# Patient Record
Sex: FEMALE | Race: WHITE | NOT HISPANIC OR LATINO | Employment: UNEMPLOYED | URBAN - NONMETROPOLITAN AREA
[De-identification: names, ages, dates, MRNs, and addresses within clinical notes are randomized per-mention and may not be internally consistent; named-entity substitution may affect disease eponyms.]

---

## 2019-06-28 ENCOUNTER — OFFICE VISIT (OUTPATIENT)
Dept: FAMILY MEDICINE CLINIC | Facility: CLINIC | Age: 39
End: 2019-06-28
Payer: COMMERCIAL

## 2019-06-28 VITALS
HEIGHT: 64 IN | SYSTOLIC BLOOD PRESSURE: 124 MMHG | WEIGHT: 250.8 LBS | BODY MASS INDEX: 42.82 KG/M2 | DIASTOLIC BLOOD PRESSURE: 68 MMHG

## 2019-06-28 DIAGNOSIS — Z13.9 SCREENING FOR UNSPECIFIED CONDITION: ICD-10-CM

## 2019-06-28 DIAGNOSIS — L40.9 PSORIASIS: Primary | ICD-10-CM

## 2019-06-28 DIAGNOSIS — L40.9 PSORIASIS OF SCALP: ICD-10-CM

## 2019-06-28 DIAGNOSIS — E66.01 MORBID OBESITY WITH BMI OF 40.0-44.9, ADULT (HCC): ICD-10-CM

## 2019-06-28 DIAGNOSIS — E78.5 HYPERLIPIDEMIA, UNSPECIFIED HYPERLIPIDEMIA TYPE: ICD-10-CM

## 2019-06-28 PROBLEM — Z87.39 HISTORY OF NECROTIZING FASCIITIS: Status: ACTIVE | Noted: 2019-06-28

## 2019-06-28 PROCEDURE — 99204 OFFICE O/P NEW MOD 45 MIN: CPT | Performed by: PHYSICIAN ASSISTANT

## 2019-06-28 PROCEDURE — 4004F PT TOBACCO SCREEN RCVD TLK: CPT | Performed by: PHYSICIAN ASSISTANT

## 2019-06-28 PROCEDURE — 3008F BODY MASS INDEX DOCD: CPT | Performed by: PHYSICIAN ASSISTANT

## 2019-06-28 RX ORDER — CLOBETASOL PROPIONATE 0.5 MG/G
AEROSOL, FOAM TOPICAL 2 TIMES DAILY
Qty: 100 G | Refills: 1 | Status: SHIPPED | OUTPATIENT
Start: 2019-06-28 | End: 2019-11-15 | Stop reason: ALTCHOICE

## 2019-06-28 RX ORDER — TRIAMCINOLONE ACETONIDE 1 MG/G
CREAM TOPICAL 2 TIMES DAILY
Qty: 80 G | Refills: 1 | Status: SHIPPED | OUTPATIENT
Start: 2019-06-28 | End: 2019-11-15 | Stop reason: ALTCHOICE

## 2019-06-28 RX ORDER — CITALOPRAM 40 MG/1
40 TABLET ORAL DAILY
COMMUNITY

## 2019-10-17 ENCOUNTER — TELEPHONE (OUTPATIENT)
Dept: FAMILY MEDICINE CLINIC | Facility: CLINIC | Age: 39
End: 2019-10-17

## 2019-11-05 ENCOUNTER — HOSPITAL ENCOUNTER (EMERGENCY)
Facility: HOSPITAL | Age: 39
Discharge: HOME | End: 2019-11-05
Attending: EMERGENCY MEDICINE | Admitting: EMERGENCY MEDICINE
Payer: COMMERCIAL

## 2019-11-05 VITALS
HEIGHT: 64 IN | SYSTOLIC BLOOD PRESSURE: 109 MMHG | HEART RATE: 81 BPM | TEMPERATURE: 97.2 F | DIASTOLIC BLOOD PRESSURE: 57 MMHG | RESPIRATION RATE: 18 BRPM | OXYGEN SATURATION: 97 % | BODY MASS INDEX: 44.19 KG/M2 | WEIGHT: 258.82 LBS

## 2019-11-05 DIAGNOSIS — S61.512A LACERATION OF LEFT WRIST, INITIAL ENCOUNTER: Primary | ICD-10-CM

## 2019-11-05 PROCEDURE — 90471 IMMUNIZATION ADMIN: CPT

## 2019-11-05 PROCEDURE — 99283 EMERGENCY DEPT VISIT LOW MDM: CPT

## 2019-11-05 PROCEDURE — 12002 RPR S/N/AX/GEN/TRNK2.6-7.5CM: CPT | Performed by: EMERGENCY MEDICINE

## 2019-11-05 PROCEDURE — 90715 TDAP VACCINE 7 YRS/> IM: CPT | Performed by: EMERGENCY MEDICINE

## 2019-11-05 PROCEDURE — 99284 EMERGENCY DEPT VISIT MOD MDM: CPT | Performed by: EMERGENCY MEDICINE

## 2019-11-05 RX ORDER — CLINDAMYCIN HYDROCHLORIDE 150 MG/1
300 CAPSULE ORAL ONCE
Status: COMPLETED | OUTPATIENT
Start: 2019-11-05 | End: 2019-11-05

## 2019-11-05 RX ORDER — LIDOCAINE HYDROCHLORIDE AND EPINEPHRINE BITARTRATE 20; .01 MG/ML; MG/ML
1 INJECTION, SOLUTION SUBCUTANEOUS ONCE
Status: COMPLETED | OUTPATIENT
Start: 2019-11-05 | End: 2019-11-05

## 2019-11-05 RX ORDER — CLINDAMYCIN HYDROCHLORIDE 300 MG/1
300 CAPSULE ORAL 3 TIMES DAILY
Qty: 15 CAPSULE | Refills: 0 | Status: SHIPPED | OUTPATIENT
Start: 2019-11-05 | End: 2019-11-10

## 2019-11-05 RX ADMIN — CLINDAMYCIN HYDROCHLORIDE 300 MG: 150 CAPSULE ORAL at 21:32

## 2019-11-05 RX ADMIN — LIDOCAINE HYDROCHLORIDE,EPINEPHRINE BITARTRATE 1 ML: 20; .01 INJECTION, SOLUTION INFILTRATION; PERINEURAL at 21:32

## 2019-11-05 RX ADMIN — MUPIROCIN: 20 OINTMENT TOPICAL at 21:17

## 2019-11-05 RX ADMIN — TETANUS TOXOID, REDUCED DIPHTHERIA TOXOID AND ACELLULAR PERTUSSIS VACCINE, ADSORBED 0.5 ML: 5; 2.5; 8; 8; 2.5 SUSPENSION INTRAMUSCULAR at 21:18

## 2019-11-06 NOTE — ED PROVIDER NOTES
History  Chief Complaint   Patient presents with    Laceration     20 minutes ago pt was using a knife to  a piece of cake when the knife slipped, lacerating her left wrist      70-year-old female presents with 4 cm laceration to the left wrist after accidentally stabbing herself while attempting scraper Brownie out of a pan  Bleeding is controlled at present      History provided by:  Patient  Laceration   Location: Left wrist   Length:  4  Depth:  Cutaneous  Quality: straight    Bleeding: venous    Laceration mechanism:  Knife  Pain details:     Quality:  Aching    Severity:  Mild  Foreign body present:  No foreign bodies  Relieved by:  Nothing  Worsened by:  Nothing  Associated symptoms: no fever and no focal weakness        Prior to Admission Medications   Prescriptions Last Dose Informant Patient Reported? Taking?   citalopram (CeleXA) 40 mg tablet Not Taking at Unknown time Self Yes No   Sig: Take 40 mg by mouth daily   clobetasol (OLUX) 0 05 % topical foam   No No   Sig: Apply topically 2 (two) times a day for 14 days   triamcinolone (KENALOG) 0 1 % cream Not Taking at Unknown time  No No   Sig: Apply topically 2 (two) times a day   Patient not taking: Reported on 11/5/2019      Facility-Administered Medications: None       History reviewed  No pertinent past medical history  Past Surgical History:   Procedure Laterality Date    BACK SURGERY      BREAST SURGERY      CHOLECYSTECTOMY      FASCIOTOMY      TONSILECTOMY AND ADNOIDECTOMY      TUBAL LIGATION         Family History   Problem Relation Age of Onset    Multiple sclerosis Mother     Cancer Father      I have reviewed and agree with the history as documented      Social History     Tobacco Use    Smoking status: Current Every Day Smoker     Packs/day: 0 50     Types: Cigarettes    Smokeless tobacco: Never Used   Substance Use Topics    Alcohol use: Yes     Frequency: Monthly or less    Drug use: Never        Review of Systems Constitutional: Negative for fever  HENT: Negative  Eyes: Negative  Respiratory: Negative  Cardiovascular: Negative  Gastrointestinal: Negative  Endocrine: Negative  Genitourinary: Negative  Musculoskeletal: Negative  Skin:        4 cm laceration to left wrist   Allergic/Immunologic: Negative  Neurological: Negative  Negative for focal weakness  Hematological: Negative  Psychiatric/Behavioral: Negative  All other systems reviewed and are negative  Physical Exam  Physical Exam   Constitutional: She appears well-developed and well-nourished  HENT:   Head: Normocephalic  Right Ear: External ear normal    Left Ear: External ear normal    Eyes: Pupils are equal, round, and reactive to light  Neck: Normal range of motion  Cardiovascular: Normal rate, regular rhythm and normal heart sounds  Pulmonary/Chest: Effort normal    Abdominal: Soft  Musculoskeletal:   4 cm laceration left wrist   Full range of motion of the wrist   No involving the joint space  Full range of motion of fingers  Neurological: She is alert  She displays normal reflexes  No cranial nerve deficit  Coordination normal    Skin: Capillary refill takes less than 2 seconds  4 cm laceration left   Psychiatric: She has a normal mood and affect  Her behavior is normal    Vitals reviewed        Vital Signs  ED Triage Vitals [11/05/19 2108]   Temperature Pulse Respirations Blood Pressure SpO2   (!) 97 2 °F (36 2 °C) 81 18 109/57 97 %      Temp Source Heart Rate Source Patient Position - Orthostatic VS BP Location FiO2 (%)   Temporal Monitor Sitting Right arm --      Pain Score       8           Vitals:    11/05/19 2108   BP: 109/57   Pulse: 81   Patient Position - Orthostatic VS: Sitting         Visual Acuity      ED Medications  Medications   tetanus-diphtheria-acellular pertussis (BOOSTRIX) IM injection 0 5 mL (0 5 mL Intramuscular Given 11/5/19 2118)   lidocaine-epinephrine (XYLOCAINE/EPINEPHRINE) 2 %-1:100,000 injection 1 mL (1 mL Infiltration Given 11/5/19 2132)   mupirocin (BACTROBAN) 2 % ointment ( Topical Given 11/5/19 2117)   clindamycin (CLEOCIN) capsule 300 mg (300 mg Oral Given 11/5/19 2132)       Diagnostic Studies  Results Reviewed     None                 No orders to display              Procedures  Procedures       ED Course                               MDM    Disposition  Final diagnoses:   Laceration of left wrist, initial encounter     Time reflects when diagnosis was documented in both MDM as applicable and the Disposition within this note     Time User Action Codes Description Comment    11/5/2019  9:29 PM Anna Olivoons Add [[id number]] Laceration of left wrist, initial encounter       ED Disposition     ED Disposition Condition Date/Time Comment    Discharge Stable Tue Nov 5, 2019  9:29 PM Rudi Crews discharge to home/self care  Follow-up Information     Follow up With Specialties Details Why Contact Info    Terri Cooley PA-C Physician Assistant In 1 day  [address] [address]  [place] [phone]            Patient's Medications   Discharge Prescriptions    CLINDAMYCIN (CLEOCIN) 300 MG CAPSULE    Take 1 capsule (300 mg total) by mouth 3 (three) times a day for 5 days       Start Date: 11/5/2019 End Date: 11/10/2019       Order Dose: 300 mg       Quantity: 15 capsule    Refills: 0     No discharge procedures on file      ED Provider  Electronically Signed by           Angel Ward DO  11/05/19 2135

## 2019-11-06 NOTE — DISCHARGE INSTRUCTIONS
Please keep the wound clean and dry for 72 hours    Please follow-up in 7-10 days to have sutures removed

## 2019-11-06 NOTE — ED PROCEDURE NOTE
PROCEDURE  Laceration repair  Date/Time: 11/5/2019 9:30 PM  Performed by: Nathan Orellana DO  Authorized by: Nathan Orellana DO   Consent: Verbal consent obtained    Consent given by: patient  Patient understanding: patient states understanding of the procedure being performed  Patient identity confirmed: verbally with patient  Location: Left dorsal wrist   Laceration length: 4 cm  Tendon involvement: none  Nerve involvement: none  Anesthesia: local infiltration    Anesthesia:  Local Anesthetic: lidocaine 2% with epinephrine  Anesthetic total: 3 mL      Procedure Details:  Irrigation solution: saline  Skin closure: 5-0 nylon  Number of sutures: 6  Technique: simple  Approximation: close  Approximation difficulty: simple  Dressing: antibiotic ointment and gauze roll           Nathan Orellana DO  11/05/19 2137

## 2019-11-07 ENCOUNTER — VBI (OUTPATIENT)
Dept: FAMILY MEDICINE CLINIC | Facility: CLINIC | Age: 39
End: 2019-11-07

## 2019-11-07 NOTE — TELEPHONE ENCOUNTER
Fauzia Person    ED Visit Information     Ed visit date: 11/5/19  Diagnosis Description: Laceration of left wrist, initial encounter  In Network? Yes [address]  Discharge status: Home  Discharged with meds ? Yes  Number of ED visits to date: 1  ED Severity:3     Outreach Information    Outreach successful: Yes 1   letter mailed:0  Date Finalized:11/7/19    Care Coordination    Follow up appointment with pcp: no [employer] sent for f/u ed visit  Transportation issues ?  No    [employer] type:  3 Day Outreach  Emergent necessity warranted by diagnosis:  Yes  [facility] PCP:  Yes  Transportation:  Self Transport  Called PCP first?:  No  Feels able to call PCP for urgent problems ?:  Yes  Understands what emergencies can be handled by PCP ?:  Yes  Ever any problems getting appointment with PCP for minor emergency/urgency problems?:  No  Practice Contacted Patient ?:  No  Pt had ED follow up with pcp/staff ?:  No    Reason Patient went to ED instead of Urgent Care or PCP?:  Perceived Severity of Illness  11/07/2019 01:35 PM Phone (Ludivina Ferro) Estefany Fairchild (Self) [phone] (E) Remove  Call Complete    By [address] [place] [address] [phone] spoke to Bradford she stated she believes everything is healing at first she declined follow up but when I asked her about her closure she stated she will need a follow up suture removal  I told her I will send a message to the practice for a follow up ED visit suture removal  Bradford is aware of [facility] urgent care locations

## 2019-11-15 ENCOUNTER — OFFICE VISIT (OUTPATIENT)
Dept: FAMILY MEDICINE CLINIC | Facility: CLINIC | Age: 39
End: 2019-11-15
Payer: COMMERCIAL

## 2019-11-15 VITALS
DIASTOLIC BLOOD PRESSURE: 76 MMHG | OXYGEN SATURATION: 97 % | HEART RATE: 78 BPM | HEIGHT: 64 IN | WEIGHT: 252.8 LBS | BODY MASS INDEX: 43.16 KG/M2 | SYSTOLIC BLOOD PRESSURE: 116 MMHG

## 2019-11-15 DIAGNOSIS — N63.10 BREAST MASS, RIGHT: Primary | ICD-10-CM

## 2019-11-15 DIAGNOSIS — Z12.4 ENCOUNTER FOR SCREENING FOR CERVICAL CANCER: ICD-10-CM

## 2019-11-15 PROBLEM — F41.9 ANXIETY: Status: ACTIVE | Noted: 2018-11-17

## 2019-11-15 PROBLEM — Z87.39 H/O NECROTIZING FASCIITIS: Status: ACTIVE | Noted: 2018-12-11

## 2019-11-15 PROCEDURE — 4004F PT TOBACCO SCREEN RCVD TLK: CPT | Performed by: PHYSICIAN ASSISTANT

## 2019-11-15 PROCEDURE — 99213 OFFICE O/P EST LOW 20 MIN: CPT | Performed by: PHYSICIAN ASSISTANT

## 2019-11-15 NOTE — PROGRESS NOTES
Assessment/Plan:    Problem List Items Addressed This Visit     None      Visit Diagnoses     Encounter for screening for cervical cancer     -  Primary    Relevant Orders    Ambulatory referral to Gynecology           Diagnoses and all orders for this visit:    Encounter for screening for cervical cancer   -     Ambulatory referral to Gynecology; Future        No problem-specific Assessment & Plan notes found for this encounter  Subjective:      Patient ID: Bridget Knight is a 44 y o  female  [employer] is here today complaining of finding a lump in R breast x few weeks ago  Lump has not changed size, but it is tender  Describes a history of a lumpectomy in the past which proved to be benign  We do not have record of this  Also, was due to have sutures removed from L wrist, however, she already removed them herself as they were "itchy "      The following portions of the patient's history were reviewed and updated as appropriate:   She has no past medical history on file  ,  does not have any pertinent problems on file  ,   has a past surgical history that includes Tubal ligation; Back surgery; Fasciotomy; Cholecystectomy; TONSILECTOMY AND ADNOIDECTOMY; and Breast surgery  ,  family history includes Cancer in her father; Multiple sclerosis in her mother  ,   reports that she has been smoking cigarettes  She has been smoking about 0 50 packs per day  She has never used smokeless tobacco  She reports that she drinks alcohol  She reports that she does not use drugs  ,  has No Known Allergies     Current Outpatient Medications   Medication Sig Dispense Refill    citalopram (CeleXA) 40 mg tablet Take 40 mg by mouth daily       No current facility-administered medications for this visit  Review of Systems   Constitutional: Negative for activity change, appetite change, chills, diaphoresis, fatigue, fever and unexpected weight change     HENT: Negative for congestion, ear pain, postnasal drip, rhinorrhea, sinus pressure, sinus pain, sneezing, sore throat, tinnitus and voice change  Eyes: Negative for pain, redness and visual disturbance  Respiratory: Negative for cough, chest tightness, shortness of breath and wheezing  Cardiovascular: Negative for chest pain, palpitations and leg swelling  Gastrointestinal: Negative for abdominal pain, blood in stool, constipation, diarrhea, nausea and vomiting  Genitourinary: Negative for difficulty urinating, dysuria, frequency, hematuria and urgency  Musculoskeletal: Negative for arthralgias, back pain, gait problem, joint swelling, myalgias, neck pain and neck stiffness  Skin: Positive for wound (L volar wrist)  Negative for color change, pallor and rash  Neurological: Negative for dizziness, tremors, weakness, light-headedness and headaches  Psychiatric/Behavioral: Negative for dysphoric mood, self-injury, sleep disturbance and suicidal ideas  The patient is not nervous/anxious  Objective:  Vitals:    11/15/19 0916   BP: 116/76   BP Location: Right arm   Patient Position: Sitting   Pulse: 78   SpO2: 97%   Weight: 115 kg (252 lb 12 8 oz)   Height: 5' 4" (1 626 m)     Body mass index is 43 39 kg/m²  Physical Exam   Constitutional: She is oriented to person, place, and time  She appears well-developed and well-nourished  No distress  HENT:   Head: Normocephalic and atraumatic  Right Ear: Hearing, tympanic membrane, external ear and ear canal normal    Left Ear: Hearing, tympanic membrane, external ear and ear canal normal    Mouth/Throat: Uvula is midline, oropharynx is clear and moist and mucous membranes are normal  No oropharyngeal exudate  Eyes: Conjunctivae are normal  Right eye exhibits no discharge  Left eye exhibits no discharge  No scleral icterus  Neck: Neck supple  Carotid bruit is not present  No thyromegaly present  Cardiovascular: Normal rate, regular rhythm and normal heart sounds  No murmur heard    Pulmonary/Chest: Effort normal and breath sounds normal  No respiratory distress  She has no wheezes  Right breast exhibits mass  There is breast tenderness  Abdominal: Soft  Bowel sounds are normal  She exhibits no distension and no mass  There is no tenderness  There is no rebound and no guarding  Musculoskeletal: Normal range of motion  She exhibits no edema or tenderness  Lymphadenopathy:     She has no cervical adenopathy  Neurological: She is alert and oriented to person, place, and time  Skin: Skin is warm and dry  No rash noted  She is not diaphoretic  No erythema  L volar rash with small healing incision, no signs of infection  Psychiatric: She has a normal mood and affect  Her behavior is normal  Judgment and thought content normal    Vitals reviewed

## 2019-11-19 ENCOUNTER — TELEPHONE (OUTPATIENT)
Dept: GYNECOLOGY | Facility: CLINIC | Age: 39
End: 2019-11-19

## 2019-12-04 ENCOUNTER — TELEPHONE (OUTPATIENT)
Dept: GYNECOLOGY | Facility: CLINIC | Age: 39
End: 2019-12-04

## 2019-12-19 ENCOUNTER — HOSPITAL ENCOUNTER (OUTPATIENT)
Dept: ULTRASOUND IMAGING | Facility: HOSPITAL | Age: 39
Discharge: HOME | End: 2019-12-19
Payer: COMMERCIAL

## 2019-12-19 ENCOUNTER — HOSPITAL ENCOUNTER (OUTPATIENT)
Dept: MAMMOGRAPHY | Facility: HOSPITAL | Age: 39
Discharge: HOME | End: 2019-12-19
Payer: COMMERCIAL

## 2019-12-19 VITALS — WEIGHT: 252 LBS | BODY MASS INDEX: 43.02 KG/M2 | HEIGHT: 64 IN

## 2019-12-19 DIAGNOSIS — Z12.4 ENCOUNTER FOR SCREENING FOR CERVICAL CANCER: ICD-10-CM

## 2019-12-19 DIAGNOSIS — N63.0 LUMP, BREAST: ICD-10-CM

## 2019-12-19 PROCEDURE — 76642 ULTRASOUND BREAST LIMITED: CPT

## 2019-12-19 PROCEDURE — G0279 TOMOSYNTHESIS, MAMMO: HCPCS

## 2019-12-19 PROCEDURE — 77066 DX MAMMO INCL CAD BI: CPT

## 2020-03-23 ENCOUNTER — TELEMEDICINE (OUTPATIENT)
Dept: FAMILY MEDICINE CLINIC | Facility: CLINIC | Age: 40
End: 2020-03-23
Payer: COMMERCIAL

## 2020-03-23 DIAGNOSIS — L40.9 PSORIASIS: ICD-10-CM

## 2020-03-23 DIAGNOSIS — J01.90 ACUTE SINUSITIS, RECURRENCE NOT SPECIFIED, UNSPECIFIED LOCATION: Primary | ICD-10-CM

## 2020-03-23 PROCEDURE — 99213 OFFICE O/P EST LOW 20 MIN: CPT | Performed by: PHYSICIAN ASSISTANT

## 2020-03-23 RX ORDER — AMOXICILLIN AND CLAVULANATE POTASSIUM 875; 125 MG/1; MG/1
1 TABLET, FILM COATED ORAL EVERY 12 HOURS SCHEDULED
Qty: 14 TABLET | Refills: 0 | Status: SHIPPED | OUTPATIENT
Start: 2020-03-23 | End: 2020-03-30

## 2020-03-23 RX ORDER — CLOBETASOL PROPIONATE 0.5 MG/G
AEROSOL, FOAM TOPICAL 2 TIMES DAILY
Qty: 50 G | Refills: 0 | Status: SHIPPED | OUTPATIENT
Start: 2020-03-23

## 2020-03-23 NOTE — PROGRESS NOTES
Virtual Brief Visit    Reason for visit is complaining of sinus pressure, ear pain, sinus drainage  Encounter provider David Oviedo PA-C    Provider located at [address] [address]      Recent Visits  No visits were found meeting these conditions  Showing recent visits within past 7 days and meeting all other requirements     Future Appointments  No visits were found meeting these conditions  Showing future appointments within next 150 days and meeting all other requirements        Patient agrees to participate in a virtual check in via telephone or video visit instead of presenting to the office to address urgent/immediate medical needs  Patient is aware this is a billable service  After connecting through telephone, the patient was identified by name and date of birth  Charanjit Dumont was informed that this was a telemedicine visit and that the visit is being conducted through telephone which may not be secure and therefore might not be HIPAA-compliant  My office door was closed  No one else was in the room  She acknowledged consent and understanding of privacy and security of the virtual check-in visit  I informed the patient that I have reviewed her record in Epic and presented the opportunity for her to ask any questions regarding the visit today  The patient initiated communication and agreed to participate  Subjective  Charanjit Dumont is a 44 y o  female  Over the past 2 days, she complains of pain in both ears, has had sinus congestion/headache, and some sinus drainage  Denies fever, feels she's had occasional chills  Ibuprofen has been helping with symptoms, She's taking 800 mg every 8 hours PRN with food  Denies abdominal pain  Denies cough, denies SOB  No past medical history on file      Past Surgical History:   Procedure Laterality Date    BACK SURGERY      BREAST EXCISIONAL BIOPSY Right     2015 rt breast benign    BREAST SURGERY      CHOLECYSTECTOMY      FASCIOTOMY      TONSILECTOMY AND ADNOIDECTOMY      TUBAL LIGATION         Current Outpatient Medications   Medication Sig Dispense Refill    amoxicillin-clavulanate (Augmentin) 875-125 mg per tablet Take 1 tablet by mouth every 12 (twelve) hours for 7 days 14 tablet 0    citalopram (CeleXA) 40 mg tablet Take 40 mg by mouth daily      clobetasol (OLUX) 0 05 % topical foam Apply topically 2 (two) times a day 50 g 0     No current facility-administered medications for this visit  No Known Allergies    Assessment    Argelia telephone assessment is sinusitis   Pt also asking for refill of Olux foam for chronic psoriasis  Disposition:    stable    I spent 15 minutes with the patient during this virtual check-in visit

## 2021-12-31 ENCOUNTER — OFFICE VISIT (OUTPATIENT)
Dept: URGENT CARE | Facility: CLINIC | Age: 41
End: 2021-12-31
Payer: COMMERCIAL

## 2021-12-31 VITALS
RESPIRATION RATE: 18 BRPM | BODY MASS INDEX: 36.32 KG/M2 | OXYGEN SATURATION: 100 % | WEIGHT: 205 LBS | HEART RATE: 93 BPM | TEMPERATURE: 97 F | HEIGHT: 63 IN

## 2021-12-31 DIAGNOSIS — K61.2 ABSCESS OF ANAL OR RECTAL REGION: Primary | ICD-10-CM

## 2021-12-31 PROCEDURE — 99213 OFFICE O/P EST LOW 20 MIN: CPT

## 2021-12-31 PROCEDURE — 87070 CULTURE OTHR SPECIMN AEROBIC: CPT

## 2021-12-31 PROCEDURE — 87077 CULTURE AEROBIC IDENTIFY: CPT

## 2021-12-31 PROCEDURE — 87186 SC STD MICRODIL/AGAR DIL: CPT

## 2021-12-31 PROCEDURE — 87205 SMEAR GRAM STAIN: CPT

## 2021-12-31 RX ORDER — CEPHALEXIN 500 MG/1
500 CAPSULE ORAL EVERY 6 HOURS SCHEDULED
Qty: 40 CAPSULE | Refills: 0 | Status: SHIPPED | OUTPATIENT
Start: 2021-12-31 | End: 2022-01-10

## 2022-01-01 ENCOUNTER — TELEPHONE (OUTPATIENT)
Dept: URGENT CARE | Facility: CLINIC | Age: 42
End: 2022-01-01

## 2022-01-03 LAB
BACTERIA WND AEROBE CULT: ABNORMAL
BACTERIA WND AEROBE CULT: ABNORMAL
GRAM STN SPEC: ABNORMAL
GRAM STN SPEC: ABNORMAL

## 2023-06-22 ENCOUNTER — HOSPITAL ENCOUNTER (EMERGENCY)
Facility: HOSPITAL | Age: 43
Discharge: HOME | End: 2023-06-22
Attending: EMERGENCY MEDICINE

## 2023-06-22 VITALS
TEMPERATURE: 97.4 F | RESPIRATION RATE: 18 BRPM | HEART RATE: 95 BPM | DIASTOLIC BLOOD PRESSURE: 68 MMHG | SYSTOLIC BLOOD PRESSURE: 126 MMHG | OXYGEN SATURATION: 99 %

## 2023-06-22 DIAGNOSIS — L03.211 FACIAL CELLULITIS: Primary | ICD-10-CM

## 2023-06-22 PROCEDURE — 99282 EMERGENCY DEPT VISIT SF MDM: CPT

## 2023-06-22 RX ORDER — LIDOCAINE HYDROCHLORIDE AND EPINEPHRINE 10; 10 MG/ML; UG/ML
1 INJECTION, SOLUTION INFILTRATION; PERINEURAL ONCE
Status: COMPLETED | OUTPATIENT
Start: 2023-06-22 | End: 2023-06-22

## 2023-06-22 RX ORDER — SULFAMETHOXAZOLE AND TRIMETHOPRIM 800; 160 MG/1; MG/1
1 TABLET ORAL ONCE
Status: COMPLETED | OUTPATIENT
Start: 2023-06-22 | End: 2023-06-22

## 2023-06-22 RX ORDER — SULFAMETHOXAZOLE AND TRIMETHOPRIM 800; 160 MG/1; MG/1
1 TABLET ORAL 2 TIMES DAILY
Qty: 14 TABLET | Refills: 0 | Status: SHIPPED | OUTPATIENT
Start: 2023-06-22 | End: 2023-06-29

## 2023-06-22 RX ADMIN — SULFAMETHOXAZOLE AND TRIMETHOPRIM 1 TABLET: 800; 160 TABLET ORAL at 07:45

## 2023-06-22 RX ADMIN — LIDOCAINE HYDROCHLORIDE,EPINEPHRINE BITARTRATE 1 ML: 10; .01 INJECTION, SOLUTION INFILTRATION; PERINEURAL at 07:30

## 2023-06-22 NOTE — Clinical Note
Dez Gerard was seen and treated in our emergency department on 6/22/2023  Diagnosis:     Chato Woodward  may return to work on return date  She may return on this date: 06/23/2023         If you have any questions or concerns, please don't hesitate to call        Justo Motta MD    ______________________________           _______________          _______________  Hospital Representative                              Date                                Time

## 2023-06-22 NOTE — ED PROVIDER NOTES
History  Chief Complaint   Patient presents with   • Abscess     Abscess on her chin started a couple of days ago  41-year-old female, presents with pain and swelling under her left chin starting a few days ago  Patient reports increased swelling and pain to the area  Denies any fevers, denies any pain or swelling in mouth  No difficulty swallowing or breathing  History provided by:  Patient   used: No    Abscess  Associated symptoms: no fever        Prior to Admission Medications   Prescriptions Last Dose Informant Patient Reported? Taking?   citalopram (CeleXA) 40 mg tablet  Self Yes No   Sig: Take 40 mg by mouth daily   clobetasol (OLUX) 0 05 % topical foam   No No   Sig: Apply topically 2 (two) times a day      Facility-Administered Medications: None       History reviewed  No pertinent past medical history  Past Surgical History:   Procedure Laterality Date   • BACK SURGERY     • BREAST EXCISIONAL BIOPSY Right     2015 rt breast benign   • BREAST SURGERY     • CHOLECYSTECTOMY     • FASCIOTOMY     • TONSILECTOMY AND ADNOIDECTOMY     • TUBAL LIGATION         Family History   Problem Relation Age of Onset   • Multiple sclerosis Mother    • Cancer Father    • No Known Problems Sister    • Cancer Paternal Grandmother    • No Known Problems Maternal Aunt    • No Known Problems Maternal Aunt    • No Known Problems Paternal Aunt    • No Known Problems Paternal Aunt      I have reviewed and agree with the history as documented  E-Cigarette/Vaping     E-Cigarette/Vaping Substances     Social History     Tobacco Use   • Smoking status: Every Day     Packs/day: 0 50     Types: Cigarettes   • Smokeless tobacco: Never   Substance Use Topics   • Alcohol use: Yes   • Drug use: Never       Review of Systems   Constitutional: Negative  Negative for fever  Respiratory: Negative  Gastrointestinal: Negative  Physical Exam  Physical Exam  Vitals and nursing note reviewed  Constitutional:       General: She is not in acute distress  HENT:      Head:        Comments: Area of swelling, tenderness, fluctuance under left chin  No swelling or erythema under tongue or in oropharynx  Cardiovascular:      Rate and Rhythm: Normal rate  Pulmonary:      Effort: Pulmonary effort is normal       Breath sounds: No stridor  Musculoskeletal:      Cervical back: Normal range of motion and neck supple  No rigidity  Neurological:      General: No focal deficit present  Mental Status: She is alert and oriented to person, place, and time  Motor: No weakness  Gait: Gait normal          Vital Signs  ED Triage Vitals   Temperature Pulse Respirations Blood Pressure SpO2   06/22/23 0708 06/22/23 0710 06/22/23 0710 06/22/23 0710 06/22/23 0710   (!) 97 4 °F (36 3 °C) 95 18 126/68 99 %      Temp Source Heart Rate Source Patient Position - Orthostatic VS BP Location FiO2 (%)   06/22/23 0708 06/22/23 0710 06/22/23 0710 06/22/23 0710 --   Temporal Monitor Sitting Right arm       Pain Score       06/22/23 0710       5           Vitals:    06/22/23 0710   BP: 126/68   Pulse: 95   Patient Position - Orthostatic VS: Sitting         Visual Acuity      ED Medications  Medications   lidocaine-epinephrine (XYLOCAINE/EPINEPHRINE) 1 %-1:100,000 injection 1 mL (1 mL Infiltration Given 6/22/23 0730)   sulfamethoxazole-trimethoprim (BACTRIM DS) 800-160 mg per tablet 1 tablet (1 tablet Oral Given 6/22/23 0745)       Diagnostic Studies  Results Reviewed     None                 No orders to display              Procedures  Incision and drain    Date/Time: 6/22/2023 7:45 AM    Performed by: Murtaza Soriano MD  Authorized by: Murtaza Soriano MD  Universal Protocol:  Consent: Verbal consent obtained      Patient location:  ED  Location:     Type:  Abscess    Location:  Head/neck    Head/neck location:  Neck  Pre-procedure details:     Skin preparation:  Chloraprep  Anesthesia (see MAR for exact dosages): Anesthesia method:  Local infiltration    Local anesthetic:  Lidocaine 1% WITH epi  Procedure details:     Needle aspiration: yes      Needle size:  18 G    Drainage amount:  Scant  Post-procedure details:     Patient tolerance of procedure: Tolerated well, no immediate complications             ED Course                               SBIRT 20yo+    Flowsheet Row Most Recent Value   Initial Alcohol Screen: US AUDIT-C     1  How often do you have a drink containing alcohol? 0 Filed at: 06/22/2023 0710   2  How many drinks containing alcohol do you have on a typical day you are drinking? 0 Filed at: 06/22/2023 0710   3b  FEMALE Any Age, or MALE 65+: How often do you have 4 or more drinks on one occassion? 0 Filed at: 06/22/2023 0710   Audit-C Score 0 Filed at: 06/22/2023 0710   DIANA: How many times in the past year have you    Used an illegal drug or used a prescription medication for non-medical reasons? Never Filed at: 06/22/2023 0710                    Medical Decision Making  59-year-old female, presenting with swelling and pain under left chin  Differential diagnosis includes abscess, cellulitis among other diagnoses  Patient clinically appears to have abscess, will attempt drainage in ED  Needle aspiration obtained small amounts of bloody fluid  Will place patient on Bactrim oral antibiotic, instructed to keep area clean and use warm compresses frequently  Discussed with patient return to emergency department if getting worse and she may require another incision and drainage procedure  I have reviewed diagnosis with patient  Follow-up plan reviewed  Precautions for acute return for re-evaluation are reviewed  Opportunity to ask questions was provided  Patient verbalizes understanding  Risk  Prescription drug management            Disposition  Final diagnoses:   Facial cellulitis     Time reflects when diagnosis was documented in both MDM as applicable and the Disposition within this note Time User Action Codes Description Comment    6/22/2023  7:41 AM Nicki Branch Add [[id number]] Facial cellulitis       ED Disposition     ED Disposition   Discharge    Condition   Stable    Date/Time   Thu Jun 22, 2023  7:41 AM    Comment   Jesse Gongora discharge to home/self care  Follow-up Information    None         Patient's Medications   Discharge Prescriptions    SULFAMETHOXAZOLE-TRIMETHOPRIM (BACTRIM DS) 800-160 MG PER TABLET    Take 1 tablet by mouth 2 (two) times a day for 7 days smx-tmp DS (BACTRIM) 800-160 mg tabs (1tab q12 D10)       Start Date: 6/22/2023 End Date: 6/29/2023       Order Dose: 1 tablet       Quantity: 14 tablet    Refills: 0       No discharge procedures on file      PDMP Review     None          ED Provider  Electronically Signed by           Kaia Cosme MD  06/22/23 2000

## 2024-03-26 ENCOUNTER — OFFICE VISIT (OUTPATIENT)
Dept: URGENT CARE | Facility: CLINIC | Age: 44
End: 2024-03-26
Payer: COMMERCIAL

## 2024-03-26 VITALS
OXYGEN SATURATION: 95 % | SYSTOLIC BLOOD PRESSURE: 117 MMHG | RESPIRATION RATE: 18 BRPM | HEART RATE: 101 BPM | TEMPERATURE: 98.4 F | DIASTOLIC BLOOD PRESSURE: 65 MMHG

## 2024-03-26 DIAGNOSIS — B34.9 ACUTE VIRAL SYNDROME: Primary | ICD-10-CM

## 2024-03-26 PROCEDURE — 99213 OFFICE O/P EST LOW 20 MIN: CPT | Performed by: PHYSICIAN ASSISTANT

## 2024-03-26 PROCEDURE — 87636 SARSCOV2 & INF A&B AMP PRB: CPT | Performed by: PHYSICIAN ASSISTANT

## 2024-03-26 RX ORDER — BENZONATATE 200 MG/1
200 CAPSULE ORAL 3 TIMES DAILY PRN
Qty: 20 CAPSULE | Refills: 0 | Status: SHIPPED | OUTPATIENT
Start: 2024-03-26

## 2024-03-26 RX ORDER — ARIPIPRAZOLE 2 MG/1
2 TABLET ORAL DAILY
COMMUNITY
Start: 2024-03-22

## 2024-03-26 RX ORDER — FLUTICASONE PROPIONATE 50 MCG
2 SPRAY, SUSPENSION (ML) NASAL DAILY
Qty: 1 G | Refills: 0 | Status: SHIPPED | OUTPATIENT
Start: 2024-03-26

## 2024-03-26 NOTE — PROGRESS NOTES
[facility]        NAME: Argelia Salter is a 43 y.o. female  : 1980    MRN: [id number]  DATE: 2024  TIME: 1:28 PM    Assessment and Plan   Acute viral syndrome [B34.9]  1. Acute viral syndrome  Covid/Flu- Lab Collect            Patient Instructions       Follow up with PCP in 3-5 days.  Proceed to  ER if symptoms worsen.    If tests have been performed at [facility], our office will contact you with results if changes need to be made to the care plan discussed with you at the visit.  [facility]    Chief Complaint     Chief Complaint   Patient presents with    Cold Like Symptoms     Fatigue, body aches, cough, since , also feeling clammy         History of Present Illness       Patient is a 43 year old female presenting to [facility] with fatigue, body aches and cough.  Patient symptoms began about 1.5 days ago.  Patient has not been taking any medications, has been sleeping a lot.  Patient denies fever, CP or SOB.  Cough/congestion.  Patient w/ excessive thirst, pt is not diabetic that she is aware of.    Fatigue  This is a new problem. The current episode started in the past 7 days. The problem occurs constantly. The problem has been gradually worsening. Associated symptoms include congestion, coughing, fatigue and myalgias. Pertinent negatives include no abdominal pain, arthralgias, chest pain, chills, fever, rash, sore throat or vomiting.       Review of Systems   Review of Systems   Constitutional:  Positive for fatigue. Negative for chills and fever.   HENT:  Positive for congestion. Negative for ear pain and sore throat.    Eyes:  Negative for pain and visual disturbance.   Respiratory:  Positive for cough. Negative for shortness of breath.    Cardiovascular:  Negative for chest pain and palpitations.   Gastrointestinal:  Negative for abdominal pain and vomiting.   Genitourinary:  Negative for dysuria and hematuria.   Musculoskeletal:   Positive for myalgias. Negative for arthralgias and back pain.   Skin:  Negative for color change and rash.   Neurological:  Negative for seizures and syncope.   All other systems reviewed and are negative.        Current Medications       Current Outpatient Medications:     ARIPiprazole (ABILIFY) 2 mg tablet, Take 2 mg by mouth daily, Disp: , Rfl:     citalopram (CeleXA) 40 mg tablet, Take 40 mg by mouth daily, Disp: , Rfl:     clobetasol (OLUX) 0.05 % topical foam, Apply topically 2 (two) times a day (Patient not taking: Reported on 3/26/2024), Disp: 50 g, Rfl: 0    Current Allergies     Allergies as of 03/26/2024    (No Known Allergies)            The following portions of the patient's history were reviewed and updated as appropriate: allergies, current medications, past family history, past medical history, past social history, past surgical history and problem list.     History reviewed. No pertinent past medical history.    Past Surgical History:   Procedure Laterality Date    BACK SURGERY      BREAST EXCISIONAL BIOPSY Right     2015 rt breast benign    BREAST SURGERY      CHOLECYSTECTOMY      FASCIOTOMY      TONSILECTOMY AND ADNOIDECTOMY      TUBAL LIGATION         Family History   Problem Relation Age of Onset    Multiple sclerosis Mother     Cancer Father     No Known Problems Sister     Cancer Paternal Grandmother     No Known Problems Maternal Aunt     No Known Problems Maternal Aunt     No Known Problems Paternal Aunt     No Known Problems Paternal Aunt          Medications have been verified.        Objective   /65   Pulse 101   Temp 98.4 °F (36.9 °C)   Resp 18   SpO2 95%   No LMP recorded.       Physical Exam     Physical Exam  Constitutional:       Appearance: Normal appearance.   HENT:      Head: Normocephalic and atraumatic.      Nose: Congestion present.      Mouth/Throat:      Mouth: Mucous membranes are moist.   Eyes:      Extraocular Movements: Extraocular movements intact.       Conjunctiva/sclera: Conjunctivae normal.      Pupils: Pupils are equal, round, and reactive to light.   Cardiovascular:      Rate and Rhythm: Normal rate and regular rhythm.      Heart sounds: No murmur heard.     No friction rub. No gallop.   Pulmonary:      Effort: Pulmonary effort is normal.      Breath sounds: No wheezing, rhonchi or rales.   Musculoskeletal:         General: Normal range of motion.      Cervical back: Normal range of motion and neck supple.   Skin:     General: Skin is warm and dry.      Capillary Refill: Capillary refill takes less than 2 seconds.   Neurological:      General: No focal deficit present.      Mental Status: She is alert and oriented to person, place, and time.   Psychiatric:         Mood and Affect: Mood normal.         Behavior: Behavior normal.

## 2024-03-26 NOTE — LETTER
March 26, 2024     Patient: Argelia Salter   YOB: 1980   Date of Visit: 3/26/2024       To Whom It May Concern:    It is my medical opinion that Argelia Salter Should not return to work until receipt of a negative Covid/Influenza test result.  IF positive patient should not return until  03/30/2024     Patient also must remain fever free for the preceding 24 hours without use of fever reducing agents.     If you have any questions or concerns, please don't hesitate to call.         Sincerely,        TOI BISWAS    CC: No Recipients

## 2024-03-27 LAB
FLUAV RNA RESP QL NAA+PROBE: NEGATIVE
FLUBV RNA RESP QL NAA+PROBE: NEGATIVE
SARS-COV-2 RNA RESP QL NAA+PROBE: NEGATIVE

## 2024-07-18 ENCOUNTER — OFFICE VISIT (OUTPATIENT)
Dept: URGENT CARE | Facility: MEDICAL CENTER | Age: 44
End: 2024-07-18

## 2024-07-18 VITALS
DIASTOLIC BLOOD PRESSURE: 66 MMHG | SYSTOLIC BLOOD PRESSURE: 116 MMHG | WEIGHT: 233 LBS | OXYGEN SATURATION: 98 % | RESPIRATION RATE: 20 BRPM | BODY MASS INDEX: 41.27 KG/M2 | TEMPERATURE: 97.8 F | HEART RATE: 99 BPM

## 2024-07-18 DIAGNOSIS — S81.802A OPEN WOUND OF LEFT LOWER LEG WITH COMPLICATION, INITIAL ENCOUNTER: Primary | ICD-10-CM

## 2024-07-18 PROCEDURE — G0381 LEV 2 HOSP TYPE B ED VISIT: HCPCS | Performed by: PHYSICIAN ASSISTANT

## 2024-07-18 RX ORDER — CEPHALEXIN 500 MG/1
500 CAPSULE ORAL EVERY 8 HOURS SCHEDULED
Qty: 21 CAPSULE | Refills: 0 | Status: SHIPPED | OUTPATIENT
Start: 2024-07-18 | End: 2024-07-25

## 2024-07-18 NOTE — LETTER
July 18, 2024     Patient: Argelia Salter   YOB: 1980   Date of Visit: 7/18/2024       To Whom It May Concern:    It is my medical opinion that Argelia Salter may return to work on 07/22/2024 .    If you have any questions or concerns, please don't hesitate to call.         Sincerely,        Brandee Kaur PA-C    CC: No Recipients

## 2024-07-18 NOTE — PATIENT INSTRUCTIONS
Start Keflex  Continue wound care as you have been doing  If you start with increased redness, red streaking or drainage go to the ER for further evaluation  Keep leg elevated when possible  Consider Wound Care

## 2024-07-18 NOTE — PROGRESS NOTES
[facility]        NAME: Argelia Salter is a 43 y.o. female  : 1980    MRN: [id number]  DATE: 2024  TIME: 3:50 PM    Assessment and Plan   Open wound of left lower leg with complication, initial encounter [S8.825Z]  1. Open wound of left lower leg with complication, initial encounter  cephalexin (KEFLEX) 500 mg capsule            Patient Instructions     Start Keflex  Continue wound care as you have been doing  If you start with increased redness, red streaking or drainage go to the ER for further evaluation  Keep leg elevated when possible  Consider Wound Care    Follow up with PCP in 3-5 days.  Proceed to  ER if symptoms worsen.    If tests have been performed at [facility], our office will contact you with results if changes need to be made to the care plan discussed with you at the visit.  [facility]    Chief Complaint     Chief Complaint   Patient presents with   • Wound Check     Bumped left lower shin off metal bed frame. Area is opened, red, and with no drainage at this time. Area is not getting any better is now hard to walk. Incident happened on Monday. Cleansed with antimicrobial wash, rubbing alcohol, and dial soap. Is prone to infections r/t lupus. No fevers or chills.          History of Present Illness       Patient walked into a metal bed frame 3 days ago.  She sustained a laceration and abrasions to the anterior aspect of the left lower leg.  She was doing well performing a localized wound care.  Today she has increased pain and redness which led her to come in for evaluation.  Patient denies drainage or fevers.  Tetanus 2019.    Ankle Swelling  Pertinent negatives include no chills or fever. The symptoms are aggravated by movement, palpation and weight bearing.   Wound Check        Review of Systems   Review of Systems   Constitutional:  Negative for chills and fever.   Skin:  Positive for wound.   Hematological:  Negative for  adenopathy.         Current Medications       Current Outpatient Medications:   •  ARIPiprazole (ABILIFY) 2 mg tablet, Take 2 mg by mouth daily, Disp: , Rfl:   •  cephalexin (KEFLEX) 500 mg capsule, Take 1 capsule (500 mg total) by mouth every 8 (eight) hours for 7 days, Disp: 21 capsule, Rfl: 0  •  citalopram (CeleXA) 40 mg tablet, Take 40 mg by mouth daily, Disp: , Rfl:   •  benzonatate (TESSALON) 200 MG capsule, Take 1 capsule (200 mg total) by mouth 3 (three) times a day as needed for cough, Disp: 20 capsule, Rfl: 0  •  clobetasol (OLUX) 0.05 % topical foam, Apply topically 2 (two) times a day (Patient not taking: Reported on 3/26/2024), Disp: 50 g, Rfl: 0  •  fluticasone (FLONASE) 50 mcg/act nasal spray, 2 sprays into each nostril daily, Disp: 1 g, Rfl: 0    Current Allergies     Allergies as of 07/18/2024   • (No Known Allergies)            The following portions of the patient's history were reviewed and updated as appropriate: allergies, current medications, past family history, past medical history, past social history, past surgical history and problem list.     Past Medical History:   Diagnosis Date   • Lupus (HCC)        Past Surgical History:   Procedure Laterality Date   • BACK SURGERY     • BREAST EXCISIONAL BIOPSY Right     2015 rt breast benign   • BREAST SURGERY     • CHOLECYSTECTOMY     • FASCIOTOMY     • TONSILECTOMY AND ADNOIDECTOMY     • TUBAL LIGATION         Family History   Problem Relation Age of Onset   • Multiple sclerosis Mother    • Cancer Father    • No Known Problems Sister    • Cancer Paternal Grandmother    • No Known Problems Maternal Aunt    • No Known Problems Maternal Aunt    • No Known Problems Paternal Aunt    • No Known Problems Paternal Aunt          Medications have been verified.        Objective   /66   Pulse 99   Temp 97.8 °F (36.6 °C)   Resp 20   Wt 106 kg (233 lb)   SpO2 98%   BMI 41.27 kg/m²   No LMP recorded.       Physical Exam     Physical Exam  Vitals  and nursing note reviewed.   Constitutional:       Appearance: Normal appearance.   HENT:      Head: Normocephalic and atraumatic.   Cardiovascular:      Rate and Rhythm: Normal rate.   Pulmonary:      Effort: Pulmonary effort is normal.   Musculoskeletal:      Comments: 2.5 cm laceration left lower leg with surrounding superficial abrasion.  There is mild surrounding erythema and warmth.  No drainage.   Skin:     General: Skin is warm.   Neurological:      Mental Status: She is alert.

## 2024-08-12 ENCOUNTER — OFFICE VISIT (OUTPATIENT)
Dept: URGENT CARE | Facility: MEDICAL CENTER | Age: 44
End: 2024-08-12

## 2024-08-12 VITALS
BODY MASS INDEX: 39.44 KG/M2 | RESPIRATION RATE: 18 BRPM | TEMPERATURE: 97.9 F | DIASTOLIC BLOOD PRESSURE: 70 MMHG | HEART RATE: 90 BPM | OXYGEN SATURATION: 100 % | HEIGHT: 64 IN | SYSTOLIC BLOOD PRESSURE: 116 MMHG | WEIGHT: 231 LBS

## 2024-08-12 DIAGNOSIS — H65.191 OTHER ACUTE NONSUPPURATIVE OTITIS MEDIA OF RIGHT EAR, RECURRENCE NOT SPECIFIED: ICD-10-CM

## 2024-08-12 DIAGNOSIS — H60.503 ACUTE OTITIS EXTERNA OF BOTH EARS, UNSPECIFIED TYPE: Primary | ICD-10-CM

## 2024-08-12 PROCEDURE — 99213 OFFICE O/P EST LOW 20 MIN: CPT

## 2024-08-12 RX ORDER — OFLOXACIN 3 MG/ML
10 SOLUTION AURICULAR (OTIC) DAILY
Qty: 5 ML | Refills: 0 | Status: SHIPPED | OUTPATIENT
Start: 2024-08-12

## 2024-08-12 RX ORDER — AZITHROMYCIN 250 MG/1
TABLET, FILM COATED ORAL
Qty: 6 TABLET | Refills: 0 | Status: SHIPPED | OUTPATIENT
Start: 2024-08-12 | End: 2024-08-16

## 2024-08-12 NOTE — PROGRESS NOTES
[facility]        NAME: Argelia Salter is a 43 y.o. female  : 1980    MRN: [id number]  DATE: 2024  TIME: 2:18 PM    Assessment and Plan   Acute otitis externa of both ears, unspecified type [H60.503]  1. Acute otitis externa of both ears, unspecified type  ofloxacin (FLOXIN) 0.3 % otic solution      2. Other acute nonsuppurative otitis media of right ear, recurrence not specified  azithromycin (ZITHROMAX) 250 mg tablet            Patient Instructions       Follow up with PCP in 3-5 days.  Proceed to  ER if symptoms worsen.    If tests are performed, our office will contact you with results only if changes need to made to the care plan discussed with you at the visit. [facility]    Chief Complaint     Chief Complaint   Patient presents with   • Earache     Bilateral ear pain for for 1 week         History of Present Illness       Ongoing ear pain x1 week. Felt like she had a fever last week but has since subsided. At home she has been using a heating pad, taking ibuprofen, and rest. Last dose of ibuprofen was just prior to arrival. She has not had any significant problems with her ears but does have psoriasis which is in her ear canals which has not been helping her discomfort. She has had drainage from the left ear but not noting any from the right ear. Reports the right ear pain started on Thursday or Friday last week. Has had increasing discomfort which has been radiating down into her jaw/cheek. She does not follow with ENT at this time.         Review of Systems   Review of Systems   Constitutional:  Positive for fever. Negative for appetite change, chills and fatigue.   HENT:  Positive for ear pain and sinus pain. Negative for congestion, rhinorrhea, sinus pressure, sore throat and trouble swallowing.    Respiratory:  Negative for cough and shortness of breath.    Gastrointestinal:  Negative for abdominal pain, constipation, diarrhea,  nausea and vomiting.   Skin:  Negative for color change and rash.   Neurological:  Positive for headaches. Negative for dizziness and light-headedness.        Frontal sinus/Temporal headache reported         Current Medications       Current Outpatient Medications:   •  ARIPiprazole (ABILIFY) 2 mg tablet, Take 2 mg by mouth daily, Disp: , Rfl:   •  azithromycin (ZITHROMAX) 250 mg tablet, Take 2 tablets today then 1 tablet daily x 4 days, Disp: 6 tablet, Rfl: 0  •  citalopram (CeleXA) 40 mg tablet, Take 40 mg by mouth daily, Disp: , Rfl:   •  ofloxacin (FLOXIN) 0.3 % otic solution, Administer 10 drops into both ears daily, Disp: 5 mL, Rfl: 0  •  benzonatate (TESSALON) 200 MG capsule, Take 1 capsule (200 mg total) by mouth 3 (three) times a day as needed for cough, Disp: 20 capsule, Rfl: 0  •  clobetasol (OLUX) 0.05 % topical foam, Apply topically 2 (two) times a day (Patient not taking: Reported on 3/26/2024), Disp: 50 g, Rfl: 0  •  fluticasone (FLONASE) 50 mcg/act nasal spray, 2 sprays into each nostril daily, Disp: 1 g, Rfl: 0    Current Allergies     Allergies as of 08/12/2024   • (No Known Allergies)            The following portions of the patient's history were reviewed and updated as appropriate: allergies, current medications, past family history, past medical history, past social history, past surgical history and problem list.     Past Medical History:   Diagnosis Date   • Lupus (HCC)        Past Surgical History:   Procedure Laterality Date   • BACK SURGERY     • BREAST EXCISIONAL BIOPSY Right     2015 rt breast benign   • BREAST SURGERY     • CHOLECYSTECTOMY     • FASCIOTOMY     • TONSILECTOMY AND ADNOIDECTOMY     • TUBAL LIGATION         Family History   Problem Relation Age of Onset   • Multiple sclerosis Mother    • Cancer Father    • No Known Problems Sister    • Cancer Paternal Grandmother    • No Known Problems Maternal Aunt    • No Known Problems Maternal Aunt    • No Known Problems Paternal Aunt   "  • No Known Problems Paternal Aunt          Medications have been verified.        Objective   /70   Pulse 90   Temp 97.9 °F (36.6 °C) (Temporal)   Resp 18   Ht 5' 4\" (1.626 m)   Wt 105 kg (231 lb)   SpO2 100%   BMI 39.65 kg/m²        Physical Exam     Physical Exam  Vitals and nursing note reviewed.   Constitutional:       General: She is not in acute distress.     Appearance: Normal appearance. She is normal weight. She is not ill-appearing.   HENT:      Head: Normocephalic and atraumatic.      Right Ear: Ear canal and external ear normal. Swelling and tenderness present. No drainage. Tympanic membrane is erythematous.      Left Ear: Ear canal and external ear normal. Swelling and tenderness present. No drainage. A middle ear effusion is present.      Ears:      Comments: Canal swelling worse in the right than left. Pain with otoscope exam.      Nose: Nose normal.      Mouth/Throat:      Lips: Pink.      Mouth: Mucous membranes are moist.      Pharynx: Oropharynx is clear.   Eyes:      Extraocular Movements: Extraocular movements intact.      Conjunctiva/sclera: Conjunctivae normal.      Pupils: Pupils are equal, round, and reactive to light.   Cardiovascular:      Rate and Rhythm: Normal rate and regular rhythm.      Pulses: Normal pulses.      Heart sounds: Normal heart sounds.   Pulmonary:      Effort: Pulmonary effort is normal.      Breath sounds: Normal breath sounds.   Abdominal:      General: Abdomen is flat. Bowel sounds are normal.      Palpations: Abdomen is soft.   Musculoskeletal:         General: Normal range of motion.      Cervical back: Full passive range of motion without pain, normal range of motion and neck supple.   Skin:     General: Skin is warm.      Capillary Refill: Capillary refill takes less than 2 seconds.      Findings: No rash.   Neurological:      General: No focal deficit present.      Mental Status: She is alert and oriented to person, place, and time.   Psychiatric: "         Mood and Affect: Mood normal.         Behavior: Behavior normal.

## 2025-03-07 ENCOUNTER — OFFICE VISIT (OUTPATIENT)
Dept: URGENT CARE | Facility: MEDICAL CENTER | Age: 45
End: 2025-03-07

## 2025-03-07 VITALS
HEART RATE: 104 BPM | DIASTOLIC BLOOD PRESSURE: 78 MMHG | BODY MASS INDEX: 41.02 KG/M2 | TEMPERATURE: 98.7 F | RESPIRATION RATE: 18 BRPM | WEIGHT: 239 LBS | OXYGEN SATURATION: 100 % | SYSTOLIC BLOOD PRESSURE: 110 MMHG

## 2025-03-07 DIAGNOSIS — A08.4 VIRAL GASTROENTERITIS: Primary | ICD-10-CM

## 2025-03-07 PROCEDURE — 99213 OFFICE O/P EST LOW 20 MIN: CPT | Performed by: PHYSICIAN ASSISTANT

## 2025-03-07 RX ORDER — ONDANSETRON 4 MG/1
4 TABLET, FILM COATED ORAL EVERY 8 HOURS PRN
Qty: 20 TABLET | Refills: 0 | Status: SHIPPED | OUTPATIENT
Start: 2025-03-07

## 2025-03-07 NOTE — PROGRESS NOTES
[facility]        NAME: Argelia Salter is a 44 y.o. female  : 1980    MRN: [id number]  DATE: 2025  TIME: 1:52 PM    Assessment and Plan   Viral gastroenteritis [A08.4]  1. Viral gastroenteritis  ondansetron (ZOFRAN) 4 mg tablet            Patient Instructions     Take medicine as prescribed prn  Increase fluid intake  Follow up with PCP in 3-5 days.  Proceed to  ER if symptoms worsen.    If tests have been performed at [facility], our office will contact you with results if changes need to be made to the care plan discussed with you at the visit.  You can review your full results on [facility].    Chief Complaint     Chief Complaint   Patient presents with    Vomiting     Nausea /vomiting and diarrhea for past 3 days . Fatigue and headachefor 4 days.         History of Present Illness       Vomiting   This is a new problem. Episode onset: 3 days ago. Associated symptoms include diarrhea. Pertinent negatives include no abdominal pain, chest pain, coughing or fever. Associated symptoms comments: Diffuse HA. Risk factors include ill contacts. She has tried increased fluids for the symptoms. The treatment provided moderate relief.   Patient reports vomiting and diarrhea appear to be improving.  Patient states last emesis was 24 hours ago and she has only had 1 episode of diarrhea today.    Review of Systems   Review of Systems   Constitutional:  Negative for fatigue and fever.   Respiratory:  Negative for cough and shortness of breath.    Cardiovascular:  Negative for chest pain.   Gastrointestinal:  Positive for diarrhea and vomiting. Negative for abdominal pain, constipation and nausea.         Current Medications       Current Outpatient Medications:     ARIPiprazole (ABILIFY) 2 mg tablet, Take 2 mg by mouth daily, Disp: , Rfl:     citalopram (CeleXA) 40 mg tablet, Take 40 mg by mouth daily, Disp: , Rfl:     ondansetron (ZOFRAN) 4 mg tablet, Take 1 tablet (4 mg total) by mouth every 8  (eight) hours as needed for nausea or vomiting, Disp: 20 tablet, Rfl: 0    Current Allergies     Allergies as of 03/07/2025    (No Known Allergies)            The following portions of the patient's history were reviewed and updated as appropriate: allergies, current medications, past family history, past medical history, past social history, past surgical history and problem list.     Past Medical History:   Diagnosis Date    Lupus        Past Surgical History:   Procedure Laterality Date    BACK SURGERY      BREAST EXCISIONAL BIOPSY Right     2015 rt breast benign    BREAST SURGERY      CHOLECYSTECTOMY      FASCIOTOMY      TONSILECTOMY AND ADNOIDECTOMY      TUBAL LIGATION         Family History   Problem Relation Age of Onset    Multiple sclerosis Mother     Cancer Father     No Known Problems Sister     Cancer Paternal Grandmother     No Known Problems Maternal Aunt     No Known Problems Maternal Aunt     No Known Problems Paternal Aunt     No Known Problems Paternal Aunt          Medications have been verified.        Objective   /78 (BP Location: Left arm, Patient Position: Sitting, Cuff Size: Standard)   Pulse 104   Temp 98.7 °F (37.1 °C) (Temporal)   Resp 18   Wt 108 kg (239 lb)   LMP 02/20/2025 (Exact Date)   SpO2 100%   BMI 41.02 kg/m²   Patient's last menstrual period was 02/20/2025 (exact date).       Physical Exam     Physical Exam  Constitutional:       Appearance: Normal appearance.   HENT:      Mouth/Throat:      Mouth: Mucous membranes are moist.   Cardiovascular:      Rate and Rhythm: Normal rate and regular rhythm.      Heart sounds: Normal heart sounds. No murmur heard.     No friction rub. No gallop.   Pulmonary:      Effort: Pulmonary effort is normal. No respiratory distress.      Breath sounds: Normal breath sounds. No stridor. No wheezing, rhonchi or rales.   Abdominal:      General: Abdomen is flat. Bowel sounds are normal.      Palpations: Abdomen is soft.      Tenderness:  There is no abdominal tenderness. There is no guarding or rebound. Negative signs include Clancy's sign.   Neurological:      Mental Status: She is alert.

## 2025-03-07 NOTE — LETTER
March 7, 2025     Patient: Argelia Salter   YOB: 1980   Date of Visit: 3/7/2025       To Whom it May Concern:    Argelia Salter was seen in my clinic on 3/7/2025. She may return to work on 3/11/25.  Please excuse patient from work on 3/6 and 3/7/25 .    If you have any questions or concerns, please don't hesitate to call.         Sincerely,          Elvira Khan PA-C        CC: No Recipients

## 2025-05-20 ENCOUNTER — APPOINTMENT (OUTPATIENT)
Dept: URGENT CARE | Facility: MEDICAL CENTER | Age: 45
End: 2025-05-20